# Patient Record
Sex: FEMALE | Race: WHITE | Employment: UNEMPLOYED | URBAN - METROPOLITAN AREA
[De-identification: names, ages, dates, MRNs, and addresses within clinical notes are randomized per-mention and may not be internally consistent; named-entity substitution may affect disease eponyms.]

---

## 2017-03-08 ENCOUNTER — OFFICE VISIT (OUTPATIENT)
Dept: FAMILY MEDICINE CLINIC | Age: 52
End: 2017-03-08

## 2017-03-08 VITALS
HEART RATE: 66 BPM | SYSTOLIC BLOOD PRESSURE: 118 MMHG | DIASTOLIC BLOOD PRESSURE: 62 MMHG | BODY MASS INDEX: 33.8 KG/M2 | WEIGHT: 212.6 LBS | TEMPERATURE: 97 F | OXYGEN SATURATION: 97 %

## 2017-03-08 DIAGNOSIS — M54.41 CHRONIC BILATERAL LOW BACK PAIN WITH BILATERAL SCIATICA: ICD-10-CM

## 2017-03-08 DIAGNOSIS — M53.3 TAIL BONE PAIN: Chronic | ICD-10-CM

## 2017-03-08 DIAGNOSIS — D50.8 OTHER IRON DEFICIENCY ANEMIA: Primary | Chronic | ICD-10-CM

## 2017-03-08 DIAGNOSIS — G89.29 CHRONIC BILATERAL LOW BACK PAIN WITH BILATERAL SCIATICA: ICD-10-CM

## 2017-03-08 DIAGNOSIS — M54.42 CHRONIC BILATERAL LOW BACK PAIN WITH BILATERAL SCIATICA: ICD-10-CM

## 2017-03-08 DIAGNOSIS — Z72.0 TOBACCO USE: ICD-10-CM

## 2017-03-08 DIAGNOSIS — Z98.84 STATUS POST GASTRIC BYPASS FOR OBESITY: Chronic | ICD-10-CM

## 2017-03-08 DIAGNOSIS — Z11.59 NEED FOR HEPATITIS C SCREENING TEST: ICD-10-CM

## 2017-03-08 DIAGNOSIS — Z23 NEED FOR SHINGLES VACCINE: ICD-10-CM

## 2017-03-08 LAB
A/G RATIO: 1.8 (ref 1.1–2.2)
ALBUMIN SERPL-MCNC: 4.1 G/DL (ref 3.4–5)
ALP BLD-CCNC: 120 U/L (ref 40–129)
ALT SERPL-CCNC: 18 U/L (ref 10–40)
ANION GAP SERPL CALCULATED.3IONS-SCNC: 13 MMOL/L (ref 3–16)
AST SERPL-CCNC: 23 U/L (ref 15–37)
BASOPHILS ABSOLUTE: 0.1 K/UL (ref 0–0.2)
BASOPHILS RELATIVE PERCENT: 0.9 %
BILIRUB SERPL-MCNC: 0.3 MG/DL (ref 0–1)
BUN BLDV-MCNC: 11 MG/DL (ref 7–20)
CALCIUM SERPL-MCNC: 9.4 MG/DL (ref 8.3–10.6)
CHLORIDE BLD-SCNC: 104 MMOL/L (ref 99–110)
CO2: 27 MMOL/L (ref 21–32)
CREAT SERPL-MCNC: 0.6 MG/DL (ref 0.6–1.1)
EOSINOPHILS ABSOLUTE: 0.2 K/UL (ref 0–0.6)
EOSINOPHILS RELATIVE PERCENT: 2.8 %
FERRITIN: 7.9 NG/ML (ref 15–150)
GFR AFRICAN AMERICAN: >60
GFR NON-AFRICAN AMERICAN: >60
GLOBULIN: 2.3 G/DL
GLUCOSE BLD-MCNC: 87 MG/DL (ref 70–99)
HCT VFR BLD CALC: 41 % (ref 36–48)
HEMOGLOBIN: 13.3 G/DL (ref 12–16)
HEPATITIS C ANTIBODY INTERPRETATION: NORMAL
IRON SATURATION: 12 % (ref 15–50)
IRON: 46 UG/DL (ref 37–145)
LYMPHOCYTES ABSOLUTE: 3.3 K/UL (ref 1–5.1)
LYMPHOCYTES RELATIVE PERCENT: 42.3 %
MCH RBC QN AUTO: 28.4 PG (ref 26–34)
MCHC RBC AUTO-ENTMCNC: 32.3 G/DL (ref 31–36)
MCV RBC AUTO: 87.9 FL (ref 80–100)
MONOCYTES ABSOLUTE: 0.4 K/UL (ref 0–1.3)
MONOCYTES RELATIVE PERCENT: 4.9 %
NEUTROPHILS ABSOLUTE: 3.8 K/UL (ref 1.7–7.7)
NEUTROPHILS RELATIVE PERCENT: 49.1 %
PDW BLD-RTO: 16.3 % (ref 12.4–15.4)
PLATELET # BLD: 277 K/UL (ref 135–450)
PMV BLD AUTO: 9.1 FL (ref 5–10.5)
POTASSIUM SERPL-SCNC: 4.9 MMOL/L (ref 3.5–5.1)
RBC # BLD: 4.67 M/UL (ref 4–5.2)
SODIUM BLD-SCNC: 144 MMOL/L (ref 136–145)
TOTAL IRON BINDING CAPACITY: 381 UG/DL (ref 260–445)
TOTAL PROTEIN: 6.4 G/DL (ref 6.4–8.2)
WBC # BLD: 7.7 K/UL (ref 4–11)

## 2017-03-08 PROCEDURE — 36415 COLL VENOUS BLD VENIPUNCTURE: CPT | Performed by: FAMILY MEDICINE

## 2017-03-08 PROCEDURE — 99213 OFFICE O/P EST LOW 20 MIN: CPT | Performed by: FAMILY MEDICINE

## 2017-03-08 RX ORDER — HYDROCODONE BITARTRATE AND ACETAMINOPHEN 7.5; 325 MG/1; MG/1
TABLET ORAL
Qty: 84 TABLET | Refills: 0 | Status: SHIPPED | OUTPATIENT
Start: 2017-03-08 | End: 2017-03-08 | Stop reason: SDUPTHER

## 2017-03-08 RX ORDER — HYDROCODONE BITARTRATE AND ACETAMINOPHEN 7.5; 325 MG/1; MG/1
TABLET ORAL
Qty: 84 TABLET | Refills: 0 | Status: SHIPPED | OUTPATIENT
Start: 2017-03-08 | End: 2017-05-31 | Stop reason: SDUPTHER

## 2017-03-08 ASSESSMENT — ENCOUNTER SYMPTOMS
ABDOMINAL DISTENTION: 0
BACK PAIN: 1
COUGH: 0
ABDOMINAL PAIN: 0

## 2017-03-14 DIAGNOSIS — D50.8 OTHER IRON DEFICIENCY ANEMIA: Primary | Chronic | ICD-10-CM

## 2017-03-14 RX ORDER — LANOLIN ALCOHOL/MO/W.PET/CERES
325 CREAM (GRAM) TOPICAL
Qty: 90 TABLET | Refills: 3 | Status: SHIPPED | OUTPATIENT
Start: 2017-03-14

## 2017-05-31 ENCOUNTER — OFFICE VISIT (OUTPATIENT)
Dept: FAMILY MEDICINE CLINIC | Age: 52
End: 2017-05-31

## 2017-05-31 VITALS
HEART RATE: 60 BPM | BODY MASS INDEX: 33.58 KG/M2 | OXYGEN SATURATION: 98 % | TEMPERATURE: 96.6 F | DIASTOLIC BLOOD PRESSURE: 72 MMHG | WEIGHT: 211.2 LBS | SYSTOLIC BLOOD PRESSURE: 122 MMHG

## 2017-05-31 DIAGNOSIS — K08.9 DENTAL DISEASE: ICD-10-CM

## 2017-05-31 DIAGNOSIS — G89.29 CHRONIC BILATERAL LOW BACK PAIN WITH BILATERAL SCIATICA: ICD-10-CM

## 2017-05-31 DIAGNOSIS — M54.42 CHRONIC BILATERAL LOW BACK PAIN WITH BILATERAL SCIATICA: ICD-10-CM

## 2017-05-31 DIAGNOSIS — F41.9 ANXIETY: ICD-10-CM

## 2017-05-31 DIAGNOSIS — M54.41 CHRONIC BILATERAL LOW BACK PAIN WITH BILATERAL SCIATICA: ICD-10-CM

## 2017-05-31 DIAGNOSIS — M53.3 TAIL BONE PAIN: Chronic | ICD-10-CM

## 2017-05-31 DIAGNOSIS — F41.8 DEPRESSION WITH ANXIETY: ICD-10-CM

## 2017-05-31 DIAGNOSIS — D50.8 OTHER IRON DEFICIENCY ANEMIA: Primary | Chronic | ICD-10-CM

## 2017-05-31 PROCEDURE — 99213 OFFICE O/P EST LOW 20 MIN: CPT | Performed by: FAMILY MEDICINE

## 2017-05-31 RX ORDER — BUSPIRONE HYDROCHLORIDE 30 MG/1
TABLET ORAL
Qty: 30 TABLET | Refills: 2 | Status: SHIPPED | OUTPATIENT
Start: 2017-05-31

## 2017-05-31 RX ORDER — HYDROCODONE BITARTRATE AND ACETAMINOPHEN 7.5; 325 MG/1; MG/1
TABLET ORAL
Qty: 84 TABLET | Refills: 0 | Status: SHIPPED | OUTPATIENT
Start: 2017-07-30 | End: 2017-05-31 | Stop reason: SDUPTHER

## 2017-05-31 RX ORDER — HYDROCODONE BITARTRATE AND ACETAMINOPHEN 7.5; 325 MG/1; MG/1
TABLET ORAL
Qty: 84 TABLET | Refills: 0 | Status: SHIPPED | OUTPATIENT
Start: 2017-07-02 | End: 2017-05-31 | Stop reason: SDUPTHER

## 2017-05-31 RX ORDER — HYDROCODONE BITARTRATE AND ACETAMINOPHEN 7.5; 325 MG/1; MG/1
TABLET ORAL
Qty: 84 TABLET | Refills: 0 | Status: SHIPPED | OUTPATIENT
Start: 2017-06-04 | End: 2017-05-31 | Stop reason: SDUPTHER

## 2017-05-31 RX ORDER — HYDROCODONE BITARTRATE AND ACETAMINOPHEN 7.5; 325 MG/1; MG/1
TABLET ORAL
Qty: 84 TABLET | Refills: 0 | Status: SHIPPED | OUTPATIENT
Start: 2017-08-27

## 2017-05-31 RX ORDER — CITALOPRAM 40 MG/1
TABLET ORAL
Qty: 90 TABLET | Refills: 3 | Status: SHIPPED | OUTPATIENT
Start: 2017-05-31

## 2017-05-31 ASSESSMENT — ENCOUNTER SYMPTOMS
ABDOMINAL PAIN: 0
BACK PAIN: 1
COUGH: 0

## 2017-10-25 ENCOUNTER — TELEPHONE (OUTPATIENT)
Dept: ORTHOPEDIC SURGERY | Age: 52
End: 2017-10-25

## 2017-10-25 NOTE — TELEPHONE ENCOUNTER
Pt called stating that they were seen in the ED at [address] [place] [address],[address]    X rays were obtained    Please review and advise medical records.

## 2017-10-30 ENCOUNTER — OFFICE VISIT (OUTPATIENT)
Dept: ORTHOPEDIC SURGERY | Age: 52
End: 2017-10-30

## 2017-10-30 VITALS — RESPIRATION RATE: 16 BRPM | HEIGHT: 66 IN | WEIGHT: 211 LBS | BODY MASS INDEX: 33.91 KG/M2

## 2017-10-30 DIAGNOSIS — R52 PAIN: ICD-10-CM

## 2017-10-30 DIAGNOSIS — S62.001A OCCULT CLOSED FRACTURE OF SCAPHOID OF RIGHT WRIST, INITIAL ENCOUNTER: Primary | ICD-10-CM

## 2017-10-30 PROBLEM — N80.9 ENDOMETRIOSIS: Status: ACTIVE | Noted: 2017-10-30

## 2017-10-30 PROBLEM — M51.37 DDD (DEGENERATIVE DISC DISEASE), LUMBOSACRAL: Status: ACTIVE | Noted: 2017-10-30

## 2017-10-30 PROCEDURE — 99204 OFFICE O/P NEW MOD 45 MIN: CPT | Performed by: ORTHOPAEDIC SURGERY

## 2017-10-30 RX ORDER — BIOTIN 1 MG
1 TABLET ORAL
COMMUNITY

## 2017-10-30 RX ORDER — PREGABALIN 25 MG/1
25 CAPSULE ORAL
COMMUNITY

## 2017-10-30 RX ORDER — IBUPROFEN 800 MG/1
800 TABLET ORAL
COMMUNITY
Start: 2017-10-24

## 2017-10-30 RX ORDER — HYDROXYZINE PAMOATE 25 MG/1
1 CAPSULE ORAL
COMMUNITY

## 2017-10-30 ASSESSMENT — ENCOUNTER SYMPTOMS
RESPIRATORY NEGATIVE: 1
BACK PAIN: 1
EYES NEGATIVE: 1
GASTROINTESTINAL NEGATIVE: 1

## 2017-10-30 NOTE — PROGRESS NOTES
mcg by mouth daily.  B Complex-Biotin-FA ER TBCR Take  by mouth. No current facility-administered medications for this visit. Past Medical History:   Diagnosis Date    Chronic lower back pain 1/30/2013    Hot flashes due to surgical menopause 1/30/2013    Iron deficiency anemia 9/23/2014    After panniculectomy 6/2014 due to excessive blood loss post op. Parenteral infusion through Dr. Joon Mcgregor in [facility] POST-ACUTE Knee pain, bilateral 1/30/2013    CAMERON on CPAP 1/30/2013    Status post gastric bypass for obesity 6/30/2012    Tail bone pain 1/30/2013    2 falls on the ice in 2010    Tobacco use 5/16/2014       Past Surgical History:   Procedure Laterality Date    GASTRIC BYPASS SURGERY  2012    Dr. Sharath Mcgowan    arthroscopic    ROBERTO AND BSO  2011    Endometriosis       Family History   Problem Relation Age of Onset    Thyroid Disease Mother     Hypertension Mother     High Cholesterol Mother     Depression Mother     Heart Attack Father 66    Obesity Mother        Social History     Social History    Marital status: Single     Spouse name: N/A    Number of children: N/A    Years of education: N/A     Occupational History    disabled      Social History Main Topics    Smoking status: Former Smoker     Packs/day: 1.00     Years: 25.00     Types: Cigarettes     Quit date: 5/13/2014    Smokeless tobacco: Never Used    Alcohol use No    Drug use: No    Sexual activity: No     Other Topics Concern    None     Social History Narrative    None         ORTHOPEDIC CONSTITUTION EXAM:     Vital Signs:  Resp 16   Ht 5' 6\" (1.676 m)   Wt 211 lb (95.7 kg)   BMI 34.06 kg/m²     Constitution:  Generally, the patient is [x] alert, [x] appears stated age, and [x] in no distress.   General body habitus is:   []Cachectic  []Thin  []Normal  []Overweight  [x]Obese  []Morbidly Obese     Psychiatric:   Judgement and insight is:  [x]Good    []Poor  Oriented to:  [x]person, [x]place, [x]time. Mood for circumstances is:  [x]Appropriate   []Inappropriate    Gait and Station:   Gait is:  [x]Normal  []Antalgic   []Trendelenburg   []Wide   []Unsteady   []Other:  Assistive Device:  []Cane    []Crutches    []Walker    []Wheelchair    []Gurney  Weight bearing on injured extremity:  [x]Is able   []Is not able      ORTHOPEDIC HAND EXAM:     HAND EXAM:  [x]Right []Left  The patient is:  [x]Right Handed    []Left Handed    Circulation:  [x] The limb is warm and well perfused. [x] Capillary refill is intact. [] Edema:    Inspection:  [x] Skin intact without abrasion or lacerations. [x] Normal hand alignment:  [] Abnormal alignment:  [] Ecchymosis:  [] Abrasion:  [] Laceration:   [] Scar / Surgical incision:  [] Orthopedic appliance:     Range of Motion:  [x] Normal Hand AROM     [x] Normal Hand PROM  [x]Can make a fist    [x]Thumb tip can touch pinky tip  [] Deferred due to fracture  Active Wrist Flexion:  []AROM = PROM   Active Wrist Extension:  []AROM = PROM   Active Radial Deviation:  []AROM = PROM   Active Ulnar Deviation:  []AROM = PROM   Passive Wrist Flexion:  []AROM = PROM   Passive Wrist Extension:  []AROM = PROM   Passive Radial Deviation:  []AROM = PROM   Passive Ulnar Deviation:  []AROM = PROM   Finger AROM:  []AROM = PROM   Finger PROM:  []AROM = PROM     Motor Function:   [x] No gross motor weakness of wrist  [x] No gross motor weakness of hand  [x]Thumbs Up    [x]Pointer finger    [x]OK sign    [x]Cross fingers    [x]Number 5  [] Motor weakness:     Neurologic:  [x] Sensation to light touch intact. [] Impaired:  [] Decreased sensation to light touch over median nerve distribution. [] Decreased sensation to light touch over ulnar nerve distribution. [x] Deep tendon reflexes intact. [] Impaired:  [x] Coordination / proprioception intact.   [] Impaired:     Palpation: (Not tested if not marked)     Normal Tenderness Swelling Crepitation Calor   Thumb: [x] [] [] [] []   Index: [x] [] [] [] []   Middle: [x] [] [] [] []   Ring: [x] [] [] [] []   Small: [x] [] [] [] []   A-1 Pulley: [x] [] [] [] []   MCP Joint: [x] [] [] [] []   Metacarpal: [x] [] [] [] []   Palmar Hand: [x] [] [] [] []   Snuff Box: [] [x] Mod [] [] []   Other: [] [] [] [] []     Comment:     Provocative Tests: (Not tested if not marked)   Negative Positive Positive Findings   Finger instability: [x] []    Thumb instability: [x] []    Triggering: [] []    Thumb CMC Grind Test: [] []    Finklestein's test: [] []    Froment's sign: [] []    Other: [] []      Contralateral Examination:  Examination of the contralateral side is performed for comparison. Unless otherwise specified above, examination of the area does not show any tenderness, deformity or injury. Range of motion is within full active and passive physiologic range for the patient's stated age. There is no gross instability. There are no rashes, ulcerations or lesions. Strength and tone are normal.      MEDICAL DATA:   Outside record review:  ER records and historical medical records    Imaging:  Hand x-ray:  3 view(s) of the right wrist were obtained and reviewed and show no fractures, subluxations or dislocations. Normal joint spacing. Normal alignment. RIGHT WRIST XR 10/24/17  Comparison: None       Findings:   3 views of the right wrist demonstrate cast along the radial aspect of the forearm and wrist    obscuring fine bony detail. Well-defined lucency within the lunate near its scaphoid    articulation suggestive of a nonspecific carpal cysts. No discrete carpal    fracture radiographic allowing for the overlying cast material       [IMPRESSION]   1. Casting of the radial aspect of the forearm and wrist as above             OUTSIDE XR                   ASSESSMENT:     1. Occult closed fracture of scaphoid of right wrist, initial encounter     2.  Pain  XR HAND RIGHT (MIN 3 VIEWS)    XR WRIST RIGHT (MIN 3 VIEWS)         PLAN:   Diagnostic and treatment options discussed. Diagnosis explained. Natural history discussed. Patient's questions answered.   Fitted for velcro thumb brace   Wear brace full time, remove for hygiene   MRI right wrist to evaluate for occult scaphoid fracture  Follow up once MRI is complete

## 2017-11-20 ENCOUNTER — HOSPITAL ENCOUNTER (OUTPATIENT)
Dept: MRI IMAGING | Age: 52
Discharge: HOME | End: 2017-11-20
Attending: ORTHOPAEDIC SURGERY | Admitting: ORTHOPAEDIC SURGERY

## 2017-11-20 DIAGNOSIS — S62.001S CLOSED NONDISPLACED FRACTURE OF SCAPHOID OF RIGHT WRIST, UNSPECIFIED PORTION OF SCAPHOID, SEQUELA: ICD-10-CM

## 2017-11-20 DIAGNOSIS — S62.001A CLOSED FRACTURE OF NAVICULAR BONE OF RIGHT WRIST: ICD-10-CM

## 2017-11-22 ENCOUNTER — TELEPHONE (OUTPATIENT)
Dept: ORTHOPEDIC SURGERY | Age: 52
End: 2017-11-22

## 2022-09-21 LAB
A/G RATIO: 2 (ref 1.2–2.2)
ALBUMIN SERPL-MCNC: 4.2 G/DL (ref 3.8–4.9)
ALP BLD-CCNC: 147 IU/L (ref 44–121)
ALT SERPL-CCNC: 20 IU/L (ref 0–32)
AST SERPL-CCNC: 21 IU/L (ref 0–40)
BASOPHILS ABSOLUTE: 0.1 X10E3/UL (ref 0–0.2)
BASOPHILS RELATIVE PERCENT: 1 %
BILIRUB SERPL-MCNC: 0.5 MG/DL (ref 0–1.2)
BUN / CREAT RATIO: 14 (ref 9–23)
BUN BLDV-MCNC: 9 MG/DL (ref 6–24)
CALCIUM SERPL-MCNC: 9.5 MG/DL (ref 8.7–10.2)
CHLORIDE BLD-SCNC: 103 MMOL/L (ref 96–106)
CO2: 24 MMOL/L (ref 20–29)
CREAT SERPL-MCNC: 0.64 MG/DL (ref 0.57–1)
EGFR (CKD-EPI): 102 ML/MIN/1.73
EOSINOPHILS ABSOLUTE: 0.1 X10E3/UL (ref 0–0.4)
EOSINOPHILS RELATIVE PERCENT: 1 %
FERRITIN: 143 NG/ML (ref 15–150)
GLOBULIN: 2.1 G/DL (ref 1.5–4.5)
GLUCOSE BLD-MCNC: 88 MG/DL (ref 65–99)
HCT VFR BLD CALC: 43 % (ref 34–46.6)
HEMOGLOBIN: 14.5 G/DL (ref 11.1–15.9)
IMMATURE GRANS (ABS): 0 X10E3/UL (ref 0–0.1)
IMMATURE GRANULOCYTES: 0 %
IRON SATURATION: 37 % (ref 15–55)
IRON: 106 UG/DL (ref 27–159)
LYMPHOCYTES ABSOLUTE: 2.1 X10E3/UL (ref 0.7–3.1)
LYMPHOCYTES RELATIVE PERCENT: 29 %
MCH RBC QN AUTO: 31.8 PG (ref 26.6–33)
MCHC RBC AUTO-ENTMCNC: 33.7 G/DL (ref 31.5–35.7)
MCV RBC AUTO: 94 FL (ref 79–97)
MONOCYTES ABSOLUTE: 0.3 X10E3/UL (ref 0.1–0.9)
MONOCYTES RELATIVE PERCENT: 5 %
NEUTROPHILS ABSOLUTE: 4.5 X10E3/UL (ref 1.4–7)
PDW BLD-RTO: 11.8 % (ref 11.7–15.4)
PLATELET # BLD: 249 X10E3/UL (ref 150–450)
POTASSIUM SERPL-SCNC: 4.5 MMOL/L (ref 3.5–5.2)
RBC # BLD: 4.56 X10E6/UL (ref 3.77–5.28)
SEGMENTED NEUTROPHILS RELATIVE PERCENT: 64 %
SODIUM BLD-SCNC: 143 MMOL/L (ref 134–144)
TOTAL IRON BINDING CAPACITY: 288 UG/DL (ref 250–450)
TOTAL PROTEIN: 6.3 G/DL (ref 6–8.5)
UNSATURATED IRON BINDING CAPACITY: 182 UG/DL (ref 131–425)
VITAMIN B-12: 939 PG/ML (ref 232–1245)
WBC # BLD: 7.1 X10E3/UL (ref 3.4–10.8)